# Patient Record
Sex: MALE | Race: WHITE | HISPANIC OR LATINO | URBAN - METROPOLITAN AREA
[De-identification: names, ages, dates, MRNs, and addresses within clinical notes are randomized per-mention and may not be internally consistent; named-entity substitution may affect disease eponyms.]

---

## 2017-08-04 ENCOUNTER — EMERGENCY (EMERGENCY)
Facility: HOSPITAL | Age: 47
LOS: 0 days | Discharge: HOME | End: 2017-08-04
Attending: EMERGENCY MEDICINE | Admitting: EMERGENCY MEDICINE
Payer: COMMERCIAL

## 2017-08-04 VITALS
HEART RATE: 71 BPM | TEMPERATURE: 98 F | RESPIRATION RATE: 18 BRPM | OXYGEN SATURATION: 100 % | SYSTOLIC BLOOD PRESSURE: 142 MMHG | WEIGHT: 197.98 LBS | DIASTOLIC BLOOD PRESSURE: 90 MMHG | HEIGHT: 69 IN

## 2017-08-04 DIAGNOSIS — M54.9 DORSALGIA, UNSPECIFIED: ICD-10-CM

## 2017-08-04 DIAGNOSIS — M54.5 LOW BACK PAIN: ICD-10-CM

## 2017-08-04 PROCEDURE — 99283 EMERGENCY DEPT VISIT LOW MDM: CPT

## 2017-08-04 RX ORDER — IBUPROFEN 200 MG
600 TABLET ORAL ONCE
Refills: 0 | Status: COMPLETED | OUTPATIENT
Start: 2017-08-04 | End: 2017-08-04

## 2017-08-04 RX ADMIN — Medication 600 MILLIGRAM(S): at 19:04
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
## 2017-08-04 NOTE — ED PROVIDER NOTE - CARE PLAN
Principal Discharge DX:	Low back pain  Instructions for follow-up, activity and diet:	Call your primary care doctor today or tomorrow to schedule follow up appointment for within the next 3-5 days.  Take ibuprofen (or Motrin) 600mg (3 tablets) up to 4 times per day as needed for pain with food or milk.  Return to this Emergency Department if you experience worsening condition or for any other emergency.
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
## 2017-08-04 NOTE — ED PROVIDER NOTE - MUSCULOSKELETAL, MLM
No spinal midline tenderness with palpation. Mild R lumbar paraspinal muscle tenderness w/ palpation. All joints ranged without deformity or tenderness.
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
## 2017-08-04 NOTE — ED PROVIDER NOTE - PLAN OF CARE
Call your primary care doctor today or tomorrow to schedule follow up appointment for within the next 3-5 days.  Take ibuprofen (or Motrin) 600mg (3 tablets) up to 4 times per day as needed for pain with food or milk.  Return to this Emergency Department if you experience worsening condition or for any other emergency.

## 2017-08-04 NOTE — ED PROVIDER NOTE - ATTENDING CONTRIBUTION TO CARE
I personally saw the patient with the resident, and completed the key components of the history and physical exam. I then discussed the management plan with the resident.

## 2021-12-07 ENCOUNTER — APPOINTMENT (OUTPATIENT)
Dept: OPHTHALMOLOGY | Facility: CLINIC | Age: 51
End: 2021-12-07
Payer: COMMERCIAL

## 2021-12-07 ENCOUNTER — NON-APPOINTMENT (OUTPATIENT)
Age: 51
End: 2021-12-07

## 2021-12-07 PROCEDURE — 92004 COMPRE OPH EXAM NEW PT 1/>: CPT

## 2022-06-20 ENCOUNTER — APPOINTMENT (OUTPATIENT)
Dept: OPHTHALMOLOGY | Facility: CLINIC | Age: 52
End: 2022-06-20
Payer: COMMERCIAL

## 2022-06-20 ENCOUNTER — NON-APPOINTMENT (OUTPATIENT)
Age: 52
End: 2022-06-20

## 2022-06-20 PROCEDURE — 65222 REMOVE FOREIGN BODY FROM EYE: CPT | Mod: RT

## 2022-06-20 PROCEDURE — 92002 INTRM OPH EXAM NEW PATIENT: CPT | Mod: 25

## 2022-07-18 ENCOUNTER — APPOINTMENT (OUTPATIENT)
Dept: OPHTHALMOLOGY | Facility: CLINIC | Age: 52
End: 2022-07-18

## 2022-10-14 ENCOUNTER — APPOINTMENT (OUTPATIENT)
Dept: OPHTHALMOLOGY | Facility: CLINIC | Age: 52
End: 2022-10-14

## 2022-10-14 ENCOUNTER — NON-APPOINTMENT (OUTPATIENT)
Age: 52
End: 2022-10-14

## 2022-10-14 PROCEDURE — 92014 COMPRE OPH EXAM EST PT 1/>: CPT

## 2024-02-19 ENCOUNTER — NON-APPOINTMENT (OUTPATIENT)
Age: 54
End: 2024-02-19

## 2024-02-19 ENCOUNTER — APPOINTMENT (OUTPATIENT)
Dept: OPHTHALMOLOGY | Facility: CLINIC | Age: 54
End: 2024-02-19
Payer: COMMERCIAL

## 2024-02-19 PROCEDURE — 92014 COMPRE OPH EXAM EST PT 1/>: CPT

## 2025-07-21 ENCOUNTER — APPOINTMENT (OUTPATIENT)
Dept: OPHTHALMOLOGY | Facility: CLINIC | Age: 55
End: 2025-07-21
Payer: COMMERCIAL

## 2025-07-21 ENCOUNTER — NON-APPOINTMENT (OUTPATIENT)
Age: 55
End: 2025-07-21

## 2025-07-21 PROCEDURE — 92014 COMPRE OPH EXAM EST PT 1/>: CPT
